# Patient Record
Sex: FEMALE | URBAN - METROPOLITAN AREA
[De-identification: names, ages, dates, MRNs, and addresses within clinical notes are randomized per-mention and may not be internally consistent; named-entity substitution may affect disease eponyms.]

---

## 2021-04-27 ENCOUNTER — TELEPHONE (OUTPATIENT)
Dept: NEUROLOGY | Facility: CLINIC | Age: 51
End: 2021-04-27

## 2021-04-27 NOTE — TELEPHONE ENCOUNTER
Best contact number for patient:[facility]    Emergency Contact name and number:     Referring provider and telephone number:[facility]     Primary Care Provider Name and if affiliated with [facility]: Bronson Vásquez    Reason for Appointment/Dx:Headache     Have you seen and followed up with a pediatric Neurologist for this disease in the past?  No        Neurology Location patient would like to be seen:[facility] received? Yes                                                Records Received? No    Have you ever seen another Neurologist?       No    Insurance Information    Insurance Name:[address]     ID/Policy #:    Secondary Insurance:    ID/Policy#: Workman's Comp/ Accident/ School  Information      Workman's Comp/Accident/School related?        None    If yes name of Insurance company:    Claim #:    Date of Injury:    Type of Injury:     Name and Telephone Number:    Notes:Appointment schedule with patient new patient pack sent                    Appointment date: 08- 10:00sm with Dr Tana Durbin

## 2022-09-08 ENCOUNTER — OFFICE VISIT (OUTPATIENT)
Dept: URGENT CARE | Facility: CLINIC | Age: 52
End: 2022-09-08
Payer: COMMERCIAL

## 2022-09-08 VITALS
TEMPERATURE: 99.8 F | DIASTOLIC BLOOD PRESSURE: 72 MMHG | HEART RATE: 100 BPM | RESPIRATION RATE: 16 BRPM | OXYGEN SATURATION: 100 % | SYSTOLIC BLOOD PRESSURE: 137 MMHG

## 2022-09-08 DIAGNOSIS — R52 BODY ACHES: ICD-10-CM

## 2022-09-08 DIAGNOSIS — R50.9 FEVER, UNSPECIFIED FEVER CAUSE: ICD-10-CM

## 2022-09-08 DIAGNOSIS — J02.9 SORE THROAT: Primary | ICD-10-CM

## 2022-09-08 LAB — S PYO AG THROAT QL: NEGATIVE

## 2022-09-08 PROCEDURE — 87880 STREP A ASSAY W/OPTIC: CPT | Performed by: PHYSICIAN ASSISTANT

## 2022-09-08 PROCEDURE — 99213 OFFICE O/P EST LOW 20 MIN: CPT | Performed by: PHYSICIAN ASSISTANT

## 2022-09-08 PROCEDURE — 87070 CULTURE OTHR SPECIMN AEROBIC: CPT | Performed by: PHYSICIAN ASSISTANT

## 2022-09-08 PROCEDURE — 87636 SARSCOV2 & INF A&B AMP PRB: CPT | Performed by: PHYSICIAN ASSISTANT

## 2022-09-08 RX ORDER — ACETAMINOPHEN 500 MG
500 TABLET ORAL EVERY 6 HOURS PRN
COMMUNITY

## 2022-09-08 RX ORDER — ATORVASTATIN CALCIUM 20 MG/1
1 TABLET, FILM COATED ORAL EVERY EVENING
COMMUNITY
Start: 2022-04-07

## 2022-09-08 RX ORDER — CETIRIZINE HYDROCHLORIDE 5 MG/1
5 TABLET ORAL DAILY
COMMUNITY

## 2022-09-08 RX ORDER — IBUPROFEN 200 MG
200 TABLET ORAL EVERY 6 HOURS PRN
COMMUNITY

## 2022-09-08 RX ORDER — ASPIRIN 81 MG/1
81 TABLET ORAL DAILY
COMMUNITY

## 2022-09-08 NOTE — PATIENT INSTRUCTIONS
Your rapid strep test was negative  No antibiotic indicated at this time  Throat swab will be sent for definitive culture  Results take approximately 48-72 hours to return  If you have not heard from the provider by the end of 3 business days, please call phone number at top of clinical summary to request the results  In the meantime you may do warm salt water gargles, over-the-counter medications and throat lozenges as needed        Follow-up with your primary care physician in 3-4 days if symptoms persist    Go to emergency room if symptoms are worsening     Corona virus testing can take 2-3 days for results    You may take OTC Vit D, Vit C and Zinc supplements as needed    Follow-up with your primary care physician if your symptoms persist and/or your test results are positive    Contact your primary care physician for a note to return to work if your test results are positive    Go to emergency room if your symptoms worsen    Access your results through [id number] (Instructions on your After Visit Summary)    If you are unable to access your [employer] account you may call the office at [phone]

## 2022-09-08 NOTE — PROGRESS NOTES
[facility]        NAME: Stephanie Seen is a 49 y o  female  : 1970    MRN: [id number]  DATE: 2022  TIME: 2:02 PM    Assessment and Plan   Sore throat [J02 9]  1  Sore throat  POCT rapid strepA    Throat culture    Cov/Flu-Collected at [place] [address] or [facility]    CANCELED: COVID Only - Collected at [place] [address] or [facility]   2  Fever, unspecified fever cause  POCT rapid strepA    Throat culture    Cov/Flu-Collected at [place] [address] or [facility]    CANCELED: COVID Only - Collected at [place] [address] or [facility]   3  Body aches  Cov/Flu-Collected at [facility] or [facility]    CANCELED: COVID Only - Collected at [place] [address] or [facility]         Patient Instructions       Follow up with PCP in 3-5 days  Proceed to  ER if symptoms worsen  Chief Complaint     Chief Complaint   Patient presents with    Cold Like Symptoms     Pt took 2 covid tests at work and they were both negative since she is having body aches, fever, chills- sore throat, started today  History of Present Illness       Patient is here for evaluation of fevers, chills, body aches, sore throat  Patient's symptoms started last night  Review of Systems   Review of Systems   Constitutional: Positive for chills and fever  Negative for activity change, appetite change, diaphoresis and fatigue  HENT: Positive for sore throat  Negative for congestion, ear discharge, ear pain, postnasal drip, rhinorrhea, sinus pressure, sinus pain and trouble swallowing  Eyes: Negative  Respiratory: Negative  Cardiovascular: Negative  Gastrointestinal: Negative  Neurological: Negative            Current Medications       Current Outpatient Medications:     acetaminophen (TYLENOL) 500 mg tablet, Take 500 mg by mouth every 6 (six) hours as needed, Disp: , Rfl:     aspirin (ECOTRIN LOW STRENGTH) 81 mg EC tablet, Take 81 mg by mouth daily, Disp: , Rfl:     atorvastatin (LIPITOR) 20 mg tablet, Take 1 tablet by mouth every evening, Disp: , Rfl:     cetirizine (ZyrTEC) 5 MG tablet, Take 5 mg by mouth daily, Disp: , Rfl:     ibuprofen (MOTRIN) 200 mg tablet, Take 200 mg by mouth every 6 (six) hours as needed, Disp: , Rfl:     Current Allergies     Allergies as of 09/08/2022    (No Known Allergies)            The following portions of the patient's history were reviewed and updated as appropriate: allergies, current medications, past family history, past medical history, past social history, past surgical history and problem list      No past medical history on file  No past surgical history on file  No family history on file  Medications have been verified  Objective   /72   Pulse 100   Temp 99 8 °F (37 7 °C)   Resp 16   SpO2 100%   No LMP recorded  Physical Exam     Physical Exam  Vitals and nursing note reviewed  Constitutional:       General: She is not in acute distress  Appearance: Normal appearance  She is well-developed  She is not ill-appearing, toxic-appearing or diaphoretic  HENT:      Head: Normocephalic and atraumatic  Right Ear: Tympanic membrane and ear canal normal       Left Ear: Tympanic membrane and ear canal normal       Nose: Nose normal  No congestion or rhinorrhea  Mouth/Throat:      Mouth: Mucous membranes are moist       Pharynx: Posterior oropharyngeal erythema (Mild) present  No oropharyngeal exudate  Eyes:      General:         Right eye: No discharge  Left eye: No discharge  Extraocular Movements: Extraocular movements intact  Conjunctiva/sclera: Conjunctivae normal       Pupils: Pupils are equal, round, and reactive to light  Cardiovascular:      Rate and Rhythm: Normal rate and regular rhythm  Heart sounds: Normal heart sounds  No murmur heard  Pulmonary:      Effort: Pulmonary effort is normal  No respiratory distress  Breath sounds: Normal breath sounds  No stridor  No wheezing, rhonchi or rales     Lymphadenopathy: Cervical: No cervical adenopathy  Skin:     General: Skin is warm and dry  Findings: No rash  Neurological:      General: No focal deficit present  Mental Status: She is alert and oriented to person, place, and time  Psychiatric:         Mood and Affect: Mood normal          Behavior: Behavior normal          Thought Content:  Thought content normal          Judgment: Judgment normal

## 2022-09-08 NOTE — LETTER
[facility] [place] [address] [address] [address]  Dept: [phone]    September 8, 2022    Patient: Dorian Neal  YOB: 1970    Dorian Neal was seen and evaluated at our [facility]  Please note if Covid and Flu tests are negative, they may return to work when fever free for 24 hours without the use of a fever reducing agent  If Covid or Flu test is positive, they may return to work on 09/13/2022, as this is 5 days from the onset of symptoms  Upon return, they must then adhere to strict masking for an additional 5 days      Sincerely,    Pino Benitez PA-C

## 2022-09-09 LAB
FLUAV RNA RESP QL NAA+PROBE: NEGATIVE
FLUBV RNA RESP QL NAA+PROBE: NEGATIVE
SARS-COV-2 RNA RESP QL NAA+PROBE: NEGATIVE

## 2022-09-10 LAB — BACTERIA THROAT CULT: NORMAL
